# Patient Record
Sex: FEMALE | Race: WHITE
[De-identification: names, ages, dates, MRNs, and addresses within clinical notes are randomized per-mention and may not be internally consistent; named-entity substitution may affect disease eponyms.]

---

## 2018-04-22 ENCOUNTER — HOSPITAL ENCOUNTER (EMERGENCY)
Dept: HOSPITAL 45 - C.EDB | Age: 78
Discharge: HOME | End: 2018-04-22
Payer: COMMERCIAL

## 2018-04-22 VITALS
HEIGHT: 64.02 IN | HEIGHT: 64.02 IN | BODY MASS INDEX: 34.66 KG/M2 | WEIGHT: 203.05 LBS | WEIGHT: 203.05 LBS | BODY MASS INDEX: 34.66 KG/M2

## 2018-04-22 VITALS — SYSTOLIC BLOOD PRESSURE: 157 MMHG | HEART RATE: 90 BPM | DIASTOLIC BLOOD PRESSURE: 90 MMHG | OXYGEN SATURATION: 95 %

## 2018-04-22 VITALS — TEMPERATURE: 97.88 F

## 2018-04-22 DIAGNOSIS — I10: ICD-10-CM

## 2018-04-22 DIAGNOSIS — N20.9: Primary | ICD-10-CM

## 2018-04-22 LAB
ALBUMIN SERPL-MCNC: 3.6 GM/DL (ref 3.4–5)
ALP SERPL-CCNC: 87 U/L (ref 45–117)
ALT SERPL-CCNC: 27 U/L (ref 12–78)
BASOPHILS # BLD: 0.02 K/UL (ref 0–0.2)
BASOPHILS NFR BLD: 0.2 %
BUN SERPL-MCNC: 14 MG/DL (ref 7–18)
CALCIUM SERPL-MCNC: 8.7 MG/DL (ref 8.5–10.1)
CO2 SERPL-SCNC: 23 MMOL/L (ref 21–32)
CREAT SERPL-MCNC: 1.14 MG/DL (ref 0.6–1.2)
EOS ABS #: 0.1 K/UL (ref 0–0.5)
EOSINOPHIL NFR BLD AUTO: 315 K/UL (ref 130–400)
GLUCOSE SERPL-MCNC: 139 MG/DL (ref 70–99)
HCT VFR BLD CALC: 40.4 % (ref 37–47)
HGB BLD-MCNC: 13.7 G/DL (ref 12–16)
IG#: 0.05 K/UL (ref 0–0.02)
IMM GRANULOCYTES NFR BLD AUTO: 21.5 %
LYMPHOCYTES # BLD: 2.1 K/UL (ref 1.2–3.4)
MCH RBC QN AUTO: 30.9 PG (ref 25–34)
MCHC RBC AUTO-ENTMCNC: 33.9 G/DL (ref 32–36)
MCV RBC AUTO: 91.2 FL (ref 80–100)
MONO ABS #: 0.51 K/UL (ref 0.11–0.59)
MONOCYTES NFR BLD: 5.2 %
NEUT ABS #: 6.98 K/UL (ref 1.4–6.5)
NEUTROPHILS # BLD AUTO: 1 %
NEUTROPHILS NFR BLD AUTO: 71.6 %
PMV BLD AUTO: 9.2 FL (ref 7.4–10.4)
PROT SERPL-MCNC: 7.5 GM/DL (ref 6.4–8.2)
RED CELL DISTRIBUTION WIDTH CV: 12.4 % (ref 11.5–14.5)
RED CELL DISTRIBUTION WIDTH SD: 41.9 FL (ref 36.4–46.3)
SODIUM SERPL-SCNC: 140 MMOL/L (ref 136–145)
WBC # BLD AUTO: 9.76 K/UL (ref 4.8–10.8)

## 2018-04-22 RX ADMIN — MORPHINE SULFATE PRN MG: 4 INJECTION, SOLUTION INTRAMUSCULAR; INTRAVENOUS at 19:08

## 2018-04-22 RX ADMIN — MORPHINE SULFATE PRN MG: 4 INJECTION, SOLUTION INTRAMUSCULAR; INTRAVENOUS at 18:43

## 2018-04-22 RX ADMIN — MORPHINE SULFATE PRN MG: 4 INJECTION, SOLUTION INTRAMUSCULAR; INTRAVENOUS at 22:14

## 2018-04-22 NOTE — EMERGENCY ROOM VISIT NOTE
History


Report prepared by Gaston:  Zaira Us


Under the Supervision of:  CHRISTOPHER MichelleO.


First contact with patient:  18:16


Chief Complaint:  FLANK PAIN


Stated Complaint:  SEVERE PAIN IN LT SIDE





History of Present Illness


The patient is a 77 year old female who presents to the Emergency Room with 

complaints of persistent right flank pain that began today. She reports that 

she had some right lower quadrant abdominal pain yesterday, noting that it went 

away on its own this morning. The patient states that she did not take any 

medication because she thought she might have urinary tract infection. A few 

hours ago, the patient's pain returned and radiated to her right flank. She 

states that she began experiencing a pressure in her bladder. The patient 

states that she has been nauseous, but denies any vomiting, cloudy or odorous 

urine, or a history of kidney stones. She states that she hardly gets her 

period anymore and the last time she had a UTI was several years ago. The 

patient notes that she had her gall bladder removed. She reports a history of 

hypertension.





   Source of History:  patient


   Onset:  today


   Position:  other (right flank)


   Quality:  other (right flank pain)


   Timing:  other (persistent)


   Associated Symptoms:  + nausea, No vomiting


Note:


Associated symptoms include: pressure in her bladder.





Patient denies: cloudy or odorous urine.





Review of Systems


See HPI for pertinent positives & negatives. A total of 10 systems reviewed and 

were otherwise negative.





Past Medical & Surgical


Medical Problems:


(1) Hypertension








Family History





Patient reports no known family medical history.


No pertinent family history.





Social History


Smoking Status:  Never Smoker


Smokeless Tobacco Use:  No


Alcohol Use:  none


Drug Use:  none


Marital Status:  


Housing Status:  lives with significant other





Current/Historical Medications


Scheduled


Atenolol (Tenormin), 25 MG PO DAILY


Atorvastatin (Lipitor), 20 MG PO DAILY


Losartan Potassium (Cozaar), 100 MG PO DAILY


Polyethylene Glycol 3350 (Miralax), 17 GM PO DAILY


Tamsulosin Hcl (Flomax), 0.4 MG PO DAILY





Scheduled PRN


Oxycodone Immediate Rel Tab (Roxicodone Ir), 1-2 TAB PO Q4H PRN for Severe Pain





Allergies


Coded Allergies:  


     No Known Allergies (Unverified , 4/22/18)





Physical Exam


Vital Signs











  Date Time  Temp Pulse Resp B/P (MAP) Pulse Ox O2 Delivery O2 Flow Rate FiO2


 


4/22/18 22:19  90 18 157/90 95 Room Air  


 


4/22/18 20:48  91 20 161/87 93 Room Air  


 


4/22/18 19:49  93      


 


4/22/18 19:05  96 18 162/89 97 Room Air  


 


4/22/18 18:12 36.6 105 18 198/93 92 Room Air  











Physical Exam


GENERAL:  Patient is awake, alert, and in no acute distress. Patient is very 

anxious and uncomfortable appearing


EYES: The conjunctivae are clear.  The pupils are round and reactive. 


EARS, NOSE, MOUTH AND THROAT: The nose is without any evidence of any 

deformity. Mucous membranes are moist tongue is midline 


NECK: The neck is nontender and supple.


RESPIRATORY: Normal respiratory effort is noted there is no evidence of 

wheezing rhonchi or rales


CARDIOVASCULAR:  Tachycardic rate but regular rhythm noted.There are no murmurs

, rubs, or gallops. Normal S1, normal S2 


GASTROINTESTINAL: Abdomen is mildly distended, but soft. Left lower quadrant 

tenderness. Bowel sounds are present in all quadrants. Abdomen is nontender


PELVIS:  The Pelvis is stable.  No tenderness to palpation is noted.  


BACK: bilateral CVA tenderness to percussion. No step-off noted range of motion 

in flexion extension as well as rotation no signs of muscle spasm noted


MUSCULOSKELETAL/EXTREMITIES: There is no evidence of gross deformity full range 

of motion is noted in the hips and shoulders


SKIN: There is no obvious evidence of any rash. There are no petechiae, pallor 

or cyanosis noted. 


NEUROLOGIC:  Patient is awake alert and oriented x3 strength is symmetric 

patellar reflexes are 2+ bilaterally





Medical Decision & Procedures


ER Provider


Diagnostic Interpretation:


Radiology results as stated below per my review and radiologist interpretation:





ABD/PELVIS NO IV OR ORAL CONT





CT DOSE: 854.38 mGy.cm





HISTORY: Flank pain  left flank pain





TECHNIQUE: Multiaxial CT images of the abdomen and pelvis were performed without


contrast.  A dose lowering technique was utilized adhering to the principles of


ALARA.





COMPARISON STUDY: None.





FINDINGS: The lung bases are clear. Liver spleen and pancreas appear


unremarkable. Prior cholecystectomy. Right kidney is negative for


hydronephrosis. There is moderate left renal hydronephrosis. There is evidence


for left hydroureter extending to the level of the bladder. There is a 6 mm


recently passed calculus posterior aspect of the bladder base versus the extreme


distal aspect of left ureteral vesicle junction.





There are findings of chronic colonic diverticulosis primarily in the region of


the sigmoid. Bowel pattern is nonobstructive.





IMPRESSION:


1. 6 mm recently passed calculus within the bladder versus at the extreme distal


aspect of the left ureterovesical junction.


2. Moderate left hydroureteronephrosis.


3. No additional acute process.





The above report was generated using voice recognition software.  It may contain


grammatical, syntax or spelling errors.





Electronically signed by:  Stephane Crowe M.D.


4/22/2018 7:15 PM





Dictated Date/Time:  4/22/2018 7:12 PM





Laboratory Results


4/22/18 18:40








Red Blood Count 4.43, Mean Corpuscular Volume 91.2, Mean Corpuscular Hemoglobin 

30.9, Mean Corpuscular Hemoglobin Concent 33.9, Mean Platelet Volume 9.2, 

Neutrophils (%) (Auto) 71.6, Lymphocytes (%) (Auto) 21.5, Monocytes (%) (Auto) 

5.2, Eosinophils (%) (Auto) 1.0, Basophils (%) (Auto) 0.2, Neutrophils # (Auto) 

6.98, Lymphocytes # (Auto) 2.10, Monocytes # (Auto) 0.51, Eosinophils # (Auto) 

0.10, Basophils # (Auto) 0.02





4/22/18 18:40

















Test


  4/22/18


18:40 4/22/18


20:30


 


White Blood Count


  9.76 K/uL


(4.8-10.8) 


 


 


Red Blood Count


  4.43 M/uL


(4.2-5.4) 


 


 


Hemoglobin


  13.7 g/dL


(12.0-16.0) 


 


 


Hematocrit 40.4 % (37-47)  


 


Mean Corpuscular Volume


  91.2 fL


() 


 


 


Mean Corpuscular Hemoglobin


  30.9 pg


(25-34) 


 


 


Mean Corpuscular Hemoglobin


Concent 33.9 g/dl


(32-36) 


 


 


Platelet Count


  315 K/uL


(130-400) 


 


 


Mean Platelet Volume


  9.2 fL


(7.4-10.4) 


 


 


Neutrophils (%) (Auto) 71.6 %  


 


Lymphocytes (%) (Auto) 21.5 %  


 


Monocytes (%) (Auto) 5.2 %  


 


Eosinophils (%) (Auto) 1.0 %  


 


Basophils (%) (Auto) 0.2 %  


 


Neutrophils # (Auto)


  6.98 K/uL


(1.4-6.5) 


 


 


Lymphocytes # (Auto)


  2.10 K/uL


(1.2-3.4) 


 


 


Monocytes # (Auto)


  0.51 K/uL


(0.11-0.59) 


 


 


Eosinophils # (Auto)


  0.10 K/uL


(0-0.5) 


 


 


Basophils # (Auto)


  0.02 K/uL


(0-0.2) 


 


 


RDW Standard Deviation


  41.9 fL


(36.4-46.3) 


 


 


RDW Coefficient of Variation


  12.4 %


(11.5-14.5) 


 


 


Immature Granulocyte % (Auto) 0.5 %  


 


Immature Granulocyte # (Auto)


  0.05 K/uL


(0.00-0.02) 


 


 


Anion Gap


  9.0 mmol/L


(3-11) 


 


 


Est Creatinine Clear Calc


Drug Dose 46.9 ml/min 


  


 


 


Estimated GFR (


American) 54.5 


  


 


 


Estimated GFR (Non-


American 47.0 


  


 


 


BUN/Creatinine Ratio 12.5 (10-20)  


 


Calcium Level


  8.7 mg/dl


(8.5-10.1) 


 


 


Total Bilirubin


  0.3 mg/dl


(0.2-1) 


 


 


Direct Bilirubin  mg/dl (0-0.2)  


 


Aspartate Amino Transf


(AST/SGOT)  U/L (15-37) 


  


 


 


Alanine Aminotransferase


(ALT/SGPT) 27 U/L (12-78) 


  


 


 


Alkaline Phosphatase


  87 U/L


() 


 


 


Total Protein


  7.5 gm/dl


(6.4-8.2) 


 


 


Albumin


  3.6 gm/dl


(3.4-5.0) 


 


 


Urine Color  YELLOW 


 


Urine Appearance  CLEAR (CLEAR) 


 


Urine pH  5.0 (4.5-7.5) 


 


Urine Specific Gravity


  


  1.017


(1.000-1.030)


 


Urine Protein  NEG (NEG) 


 


Urine Glucose (UA)  NEG (NEG) 


 


Urine Ketones  NEG (NEG) 


 


Urine Occult Blood  2+ (NEG) 


 


Urine Nitrite  NEG (NEG) 


 


Urine Bilirubin  NEG (NEG) 


 


Urine Urobilinogen  NEG (NEG) 


 


Urine Leukocyte Esterase  TRACE (NEG) 


 


Urine WBC (Auto)


  


  5-10 /hpf


(0-5)


 


Urine RBC (Auto)


  


  10-30 /hpf


(0-4)


 


Urine Hyaline Casts (Auto)  1-5 /lpf (0-5) 


 


Urine Epithelial Cells (Auto)  >30 /lpf (0-5) 


 


Urine Bacteria (Auto)  NEG (NEG) 





Laboratory results per my review.





Medications Administered











 Medications


  (Trade)  Dose


 Ordered  Sig/Noah


 Route  Start Time


 Stop Time Status Last Admin


Dose Admin


 


 Sodium Chloride  1,000 ml @ 


 999 mls/hr  Q1H1M STAT


 IV  4/22/18 18:22


 4/22/18 19:22 DC 4/22/18 18:43


999 MLS/HR


 


 Ondansetron HCl


  (Zofran Inj)  4 mg  NOW  STAT


 IV  4/22/18 18:22


 4/22/18 18:23 DC 4/22/18 18:43


4 MG


 


 Morphine Sulfate


  (MoRPHine


 SULFATE INJ)  4 mg  Q15M  PRN


 IV  4/22/18 18:30


 4/23/18 00:10 DC 4/22/18 22:14


4 MG


 


 Sodium Chloride  1,000 ml @ 


 999 mls/hr  Q1H1M STAT


 IV  4/22/18 19:51


 4/22/18 20:51 DC 4/22/18 19:51


999 MLS/HR


 


 Tamsulosin HCl


  (Flomax Cap)  0.4 mg  NOW  ONCE


 PO  4/22/18 22:00


 4/22/18 22:01 DC 4/22/18 22:09


0.4 MG


 


 Oxycodone HCl


  (Roxicodone


 Immediate Rel 5MG


 Home Pack)  1 homepack  UD  ONCE


 PO  4/22/18 22:00


 4/22/18 22:01 DC 4/22/18 22:09


1 HOMEPACK











ED Course


1817: The patient was evaluated in room C5. A complete history and physical 

examination were performed. 





1822: Ordered Zofran Inj 4mg IV and Sodium Chloride 1000 ml @ 999 mls/hr IV. 





1830: Ordered Morphine Sulfate 4mg IV.





1912: I reevaluated the patient, who was resting. I updated her on test 

findings and she verbalized complete understanding. 





1951: Ordered Sodium Chloride 1000 ml @ 999 mls/hr IV.





2030: Ordered Lorazepam 0.5 mg. 





2200: Ordered Oxycodone HCl 1 homepack PO and Tamsulosin HCl 0.4mg PO. 





2235: Upon reevaluation, the patient is resting comfortably. I discussed the 

results and treatment plan with her. She verbalized agreement of the treatment 

plan. The patient was discharged home.





Medical Decision


Prior records/ancillary studies reviewed.


Triage Nursing notes reviewed.





Differential diagnosis:


Etiologies such as renal colic, appendicitis, diverticulitis, mesenteric 

ischemia, aortic pathology, infections, inflammatory bowel disease, PUD, 

biliary pathology, UTI, as well as others were entertained.





The patient is a 77-year-old female who presented to the emergency department 

for an evaluation of flank pain.  The patient was found to have significant 

hydronephrosis and hydroureter with a very large stone which was difficult to 

locate the exact position on CAT scan.  It appears that it may have recently 

passed or possibly is in the distal aspect of the left ureter.  Clinically the 

patient was feeling much better and I feel this could be related to the 

recently passed stone.  I discussed patient's laboratory and radiographic 

studies with her.  She was encouraged to continue all medications as 

prescribed.  She was also started on Flomax.  She was encouraged to follow-up 

with the urologist as soon as she could.  She is not from the area so she was 

given copies of her radiographic studies.  I discussed that this could still be 

in the distal ureter and that if she has a return of pain similar to her 

initial presentation she should present to another emergency department as soon 

as possible or return to our emergency department.





Medication Reconcilliation


Current Medication List:  was personally reviewed by me





Blood Pressure Screening


Patient's blood pressure:  Elevated blood pressure





Impression





 Primary Impression:  


 Left flank pain


 Additional Impressions:  


 Kidney stone on left side


 Hydronephrosis


 Hydroureter





Scribe Attestation


The scribe's documentation has been prepared under my direction and personally 

reviewed by me in its entirety. I confirm that the note above accurately 

reflects all work, treatment, procedures, and medical decision making performed 

by me.





Departure Information


Dispostion


Home / Self-Care





Prescriptions





Tamsulosin Hcl (FLOMAX) 0.4 Mg Cap


0.4 MG PO DAILY, #10 CAP


   Prov: Polo Wilkerson, DO         4/22/18 


Polyethylene Glycol 3350 (MIRALAX) 1 Pow Pow


17 GM PO DAILY, #527 GM


   Prov: Polo Wilkerson, DO         4/22/18 


Oxycodone Immediate Rel Tab (ROXICODONE IR) 5 Mg Tab


1-2 TAB PO Q4H Y for Severe Pain, #20 TAB


   Prov: Polo Wilkerson,          4/22/18





Referrals


No Doctor, Assigned (PCP)





Forms


HOME CARE DOCUMENTATION FORM,                                                 

               IMPORTANT VISIT INFORMATION





Patient Instructions


My [facility] [employer]





Additional Instructions





Continue all medications as prescribed.  Drink plenty clear liquids.  Continue 

using Motrin and Tylenol as directed for mild pain.  Call your family doctor to 

schedule a follow-up appointment with the urologist as soon as possible.  

Return to the emergency department immediately if symptoms change worsen or the 

need arises.





Problem Qualifiers








 Additional Impressions:  


 Hydronephrosis


 Hydronephrosis type:  unspecified  Qualified Codes:  N13.30 - Unspecified 

hydronephrosis

## 2018-04-22 NOTE — DIAGNOSTIC IMAGING REPORT
ABD/PELVIS NO IV OR ORAL CONT



CT DOSE: 854.38 mGy.cm



HISTORY: Flank pain  left flank pain



TECHNIQUE: Multiaxial CT images of the abdomen and pelvis were performed without

contrast.  A dose lowering technique was utilized adhering to the principles of

ALARA.





COMPARISON STUDY: None.



FINDINGS: The lung bases are clear. Liver spleen and pancreas appear

unremarkable. Prior cholecystectomy. Right kidney is negative for

hydronephrosis. There is moderate left renal hydronephrosis. There is evidence

for left hydroureter extending to the level of the bladder. There is a 6 mm

recently passed calculus posterior aspect of the bladder base versus the extreme

distal aspect of left ureteral vesicle junction.



There are findings of chronic colonic diverticulosis primarily in the region of

the sigmoid. Bowel pattern is nonobstructive.



IMPRESSION:

1. 6 mm recently passed calculus within the bladder versus at the extreme distal

aspect of the left ureterovesical junction.

2. Moderate left hydroureteronephrosis.

3. No additional acute process.







The above report was generated using voice recognition software.  It may contain

grammatical, syntax or spelling errors.







Electronically signed by:  Stephane Crowe M.D.

4/22/2018 7:15 PM



Dictated Date/Time:  4/22/2018 7:12 PM